# Patient Record
Sex: MALE | Employment: FULL TIME | URBAN - METROPOLITAN AREA
[De-identification: names, ages, dates, MRNs, and addresses within clinical notes are randomized per-mention and may not be internally consistent; named-entity substitution may affect disease eponyms.]

---

## 2022-06-17 ENCOUNTER — OFFICE VISIT (OUTPATIENT)
Dept: ORTHOPEDIC SURGERY | Age: 37
End: 2022-06-17
Payer: MEDICAID

## 2022-06-17 VITALS — BODY MASS INDEX: 34.3 KG/M2 | WEIGHT: 245 LBS | HEIGHT: 71 IN

## 2022-06-17 DIAGNOSIS — M25.462 EFFUSION OF LEFT KNEE: Primary | ICD-10-CM

## 2022-06-17 PROCEDURE — 20610 DRAIN/INJ JOINT/BURSA W/O US: CPT | Performed by: ORTHOPAEDIC SURGERY

## 2022-06-17 PROCEDURE — 99203 OFFICE O/P NEW LOW 30 MIN: CPT | Performed by: ORTHOPAEDIC SURGERY

## 2022-06-17 RX ORDER — BUPIVACAINE HYDROCHLORIDE 2.5 MG/ML
6 INJECTION, SOLUTION INFILTRATION; PERINEURAL ONCE
Status: COMPLETED | OUTPATIENT
Start: 2022-06-17 | End: 2022-06-17

## 2022-06-17 RX ORDER — TRIAMCINOLONE ACETONIDE 40 MG/ML
40 INJECTION, SUSPENSION INTRA-ARTICULAR; INTRAMUSCULAR ONCE
Status: COMPLETED | OUTPATIENT
Start: 2022-06-17 | End: 2022-06-17

## 2022-06-17 RX ORDER — METHADONE HYDROCHLORIDE 10 MG/1
70 TABLET ORAL
COMMUNITY

## 2022-06-17 RX ADMIN — BUPIVACAINE HYDROCHLORIDE 15 MG: 2.5 INJECTION, SOLUTION INFILTRATION; PERINEURAL at 14:05

## 2022-06-17 RX ADMIN — TRIAMCINOLONE ACETONIDE 40 MG: 40 INJECTION, SUSPENSION INTRA-ARTICULAR; INTRAMUSCULAR at 14:05

## 2022-06-17 NOTE — PROGRESS NOTES
Cristhian Florez (: 1985) is a 39 y.o. male, established patient, here for evaluation of the following chief complaint(s):  Knee Pain       ASSESSMENT/PLAN:  Below is the assessment and plan developed based on review of pertinent history, physical exam, labs, studies, and medications. He elected to try an aspiration and injection of the left knee. We discussed that if he has any continued symptoms or recurrent buckling symptoms then we could consider an MRI. We discussed the risks and benefits of injection and informed consent was obtained. After a sterile preparation, 30 cc of serous fluid was aspirated from the left knee and then 6 cc of bupivicaine and 40 mg of Kenalog were injected into the left knee. The patient tolerated the procedure well and there were no complications. Post injection pain, blood sugar elevation, skin discoloration, fatty atrophy and the signs of infection were discussed in detail. The patient was instructed to contact us if there were any questions or concerns prior to their follow up appointment. 1. Effusion of left knee      Return if symptoms worsen or fail to improve. SUBJECTIVE/OBJECTIVE:  Cristhian Florez (: 1985) is a 39 y.o. male. He notes random swelling of the knee that happened within the last few weeks. He works as a . He denies any history of knee issues prior to this flareup. He grew up playing sports. He notes swelling and some buckling of the knee. Mild pain. Denies any fever or chills. No recent tick bites. No history of gout        Allergies   Allergen Reactions    Biaxin [Clarithromycin] Rash    Ceclor [Cefaclor] Unknown (comments)    Demerol [Meperidine] Unknown (comments)       Current Outpatient Medications   Medication Sig    empagliflozin (Jardiance) 25 mg tablet Take  by mouth daily.  methadone (DOLOPHINE) 10 mg tablet Take 20 mg by mouth. No current facility-administered medications for this visit. Social History     Socioeconomic History    Marital status: UNKNOWN     Spouse name: Not on file    Number of children: Not on file    Years of education: Not on file    Highest education level: Not on file   Occupational History    Not on file   Tobacco Use    Smoking status: Never Smoker    Smokeless tobacco: Not on file   Vaping Use    Vaping Use: Never used   Substance and Sexual Activity    Alcohol use: Not Currently    Drug use: Not Currently    Sexual activity: Not on file   Other Topics Concern    Not on file   Social History Narrative    Not on file     Social Determinants of Health     Financial Resource Strain:     Difficulty of Paying Living Expenses: Not on file   Food Insecurity:     Worried About Running Out of Food in the Last Year: Not on file    Oliver of Food in the Last Year: Not on file   Transportation Needs:     Lack of Transportation (Medical): Not on file    Lack of Transportation (Non-Medical): Not on file   Physical Activity:     Days of Exercise per Week: Not on file    Minutes of Exercise per Session: Not on file   Stress:     Feeling of Stress : Not on file   Social Connections:     Frequency of Communication with Friends and Family: Not on file    Frequency of Social Gatherings with Friends and Family: Not on file    Attends Confucianism Services: Not on file    Active Member of [address] [place] [address] [employer] or Organizations: Not on file    Attends Club or Organization Meetings: Not on file    Marital Status: Not on file   Intimate Partner Violence:     Fear of Current or Ex-Partner: Not on file    Emotionally Abused: Not on file    Physically Abused: Not on file    Sexually Abused: Not on file   Housing Stability:     Unable to Pay for Housing in the Last Year: Not on file    Number of [place] in the Last Year: Not on file    Unstable Housing in the Last Year: Not on file       History reviewed. No pertinent surgical history. History reviewed.  No pertinent family history. REVIEW OF SYSTEMS:  ROS     Positive for: Musculoskeletal    Last edited by Earl Rich on 6/17/2022  1:34 PM. (History)        Patient denies any recent fever, chills, nausea, vomiting, chest pain, or shortness of breath. Vitals:  Ht 5' 11\" (1.803 m)   Wt 245 lb (111.1 kg)   BMI 34.17 kg/m²    Body mass index is 34.17 kg/m². PHYSICAL EXAM:  General exam: Patient is awake, alert, and oriented x3. Well-appearing. No acute distress. Ambulates with an antalgic gait    Left knee: Mild tenderness palpation of the parapatellar region and joint line. No erythema or warmth. There is moderate effusion present today. Slight stiffness with attempted full flexion of the knee. Mild crepitus. Normal stability. Richmond's exam is slightly painful    IMAGING:  X-rays of the left knee from outside facility show normal joint space with mild effusion  XR Results (most recent):  No results found for this or any previous visit. No orders of the defined types were placed in this encounter. An electronic signature was used to authenticate this note.   -- Amaya Smith DO

## 2022-10-29 VITALS
OXYGEN SATURATION: 98 % | DIASTOLIC BLOOD PRESSURE: 97 MMHG | TEMPERATURE: 98.3 F | WEIGHT: 247.8 LBS | HEART RATE: 93 BPM | SYSTOLIC BLOOD PRESSURE: 147 MMHG | RESPIRATION RATE: 19 BRPM | HEIGHT: 71 IN | BODY MASS INDEX: 34.69 KG/M2

## 2022-10-29 PROCEDURE — 75810000289 HC I&D ABSCESS SIMP/COMP/MULT

## 2022-10-29 PROCEDURE — 99284 EMERGENCY DEPT VISIT MOD MDM: CPT

## 2022-10-30 ENCOUNTER — APPOINTMENT (OUTPATIENT)
Dept: GENERAL RADIOLOGY | Age: 37
End: 2022-10-30
Attending: EMERGENCY MEDICINE
Payer: MEDICAID

## 2022-10-30 ENCOUNTER — HOSPITAL ENCOUNTER (EMERGENCY)
Age: 37
Discharge: HOME | End: 2022-10-30
Attending: EMERGENCY MEDICINE
Payer: MEDICAID

## 2022-10-30 DIAGNOSIS — L03.039 PARONYCHIA OF THIRD TOE: Primary | ICD-10-CM

## 2022-10-30 LAB
ALBUMIN SERPL-MCNC: 4 G/DL (ref 3.5–5)
ALBUMIN/GLOB SERPL: 1 {RATIO} (ref 1.1–2.2)
ALP SERPL-CCNC: 61 U/L (ref 45–117)
ALT SERPL-CCNC: 29 U/L (ref 12–78)
ANION GAP SERPL CALC-SCNC: 7 MMOL/L (ref 5–15)
AST SERPL-CCNC: 15 U/L (ref 15–37)
BASOPHILS # BLD: 0.1 K/UL (ref 0–0.1)
BASOPHILS NFR BLD: 1 % (ref 0–1)
BILIRUB SERPL-MCNC: 0.5 MG/DL (ref 0.2–1)
BUN SERPL-MCNC: 21 MG/DL (ref 6–20)
BUN/CREAT SERPL: 20 (ref 12–20)
CALCIUM SERPL-MCNC: 9.5 MG/DL (ref 8.5–10.1)
CHLORIDE SERPL-SCNC: 100 MMOL/L (ref 97–108)
CO2 SERPL-SCNC: 30 MMOL/L (ref 21–32)
CREAT SERPL-MCNC: 1.07 MG/DL (ref 0.7–1.3)
DIFFERENTIAL METHOD BLD: ABNORMAL
EOSINOPHIL # BLD: 0.3 K/UL (ref 0–0.4)
EOSINOPHIL NFR BLD: 2 % (ref 0–7)
ERYTHROCYTE [DISTWIDTH] IN BLOOD BY AUTOMATED COUNT: 12.7 % (ref 11.5–14.5)
GLOBULIN SER CALC-MCNC: 4 G/DL (ref 2–4)
GLUCOSE SERPL-MCNC: 146 MG/DL (ref 65–100)
HCT VFR BLD AUTO: 47.4 % (ref 36.6–50.3)
HGB BLD-MCNC: 15.7 G/DL (ref 12.1–17)
IMM GRANULOCYTES # BLD AUTO: 0.1 K/UL (ref 0–0.04)
IMM GRANULOCYTES NFR BLD AUTO: 1 % (ref 0–0.5)
LYMPHOCYTES # BLD: 3 K/UL (ref 0.8–3.5)
LYMPHOCYTES NFR BLD: 23 % (ref 12–49)
MCH RBC QN AUTO: 29 PG (ref 26–34)
MCHC RBC AUTO-ENTMCNC: 33.1 G/DL (ref 30–36.5)
MCV RBC AUTO: 87.6 FL (ref 80–99)
MONOCYTES # BLD: 0.9 K/UL (ref 0–1)
MONOCYTES NFR BLD: 7 % (ref 5–13)
NEUTS SEG # BLD: 8.8 K/UL (ref 1.8–8)
NEUTS SEG NFR BLD: 66 % (ref 32–75)
NRBC # BLD: 0 K/UL (ref 0–0.01)
NRBC BLD-RTO: 0 PER 100 WBC
PLATELET # BLD AUTO: 269 K/UL (ref 150–400)
PMV BLD AUTO: 9.8 FL (ref 8.9–12.9)
POTASSIUM SERPL-SCNC: 4 MMOL/L (ref 3.5–5.1)
PROT SERPL-MCNC: 8 G/DL (ref 6.4–8.2)
RBC # BLD AUTO: 5.41 M/UL (ref 4.1–5.7)
SODIUM SERPL-SCNC: 137 MMOL/L (ref 136–145)
WBC # BLD AUTO: 13.1 K/UL (ref 4.1–11.1)

## 2022-10-30 PROCEDURE — 74011250637 HC RX REV CODE- 250/637: Performed by: EMERGENCY MEDICINE

## 2022-10-30 PROCEDURE — 36415 COLL VENOUS BLD VENIPUNCTURE: CPT

## 2022-10-30 PROCEDURE — 80053 COMPREHEN METABOLIC PANEL: CPT

## 2022-10-30 PROCEDURE — 75810000289 HC I&D ABSCESS SIMP/COMP/MULT

## 2022-10-30 PROCEDURE — 73660 X-RAY EXAM OF TOE(S): CPT

## 2022-10-30 PROCEDURE — 85025 COMPLETE CBC W/AUTO DIFF WBC: CPT

## 2022-10-30 RX ORDER — MUPIROCIN 20 MG/G
OINTMENT TOPICAL DAILY
Qty: 22 G | Refills: 0 | Status: SHIPPED | OUTPATIENT
Start: 2022-10-30

## 2022-10-30 RX ORDER — SULFAMETHOXAZOLE AND TRIMETHOPRIM 800; 160 MG/1; MG/1
1 TABLET ORAL
Status: COMPLETED | OUTPATIENT
Start: 2022-10-30 | End: 2022-10-30

## 2022-10-30 RX ORDER — SULFAMETHOXAZOLE AND TRIMETHOPRIM 800; 160 MG/1; MG/1
1 TABLET ORAL 2 TIMES DAILY
Qty: 14 TABLET | Refills: 0 | Status: SHIPPED | OUTPATIENT
Start: 2022-10-30 | End: 2022-11-06

## 2022-10-30 RX ADMIN — SULFAMETHOXAZOLE AND TRIMETHOPRIM 1 TABLET: 800; 160 TABLET ORAL at 06:57

## 2022-10-30 NOTE — LETTER
NOTIFICATION RETURN TO WORK / SCHOOL    10/30/2022 7:41 AM    Mr. Jackie Pollard  [address] [address]      To Whom It May Concern:    Jackie Pollard is currently under the care of [facility] EMERGENCY DEPT. He will return to work/school on: November 3rd, 2022    Jackie Pollard may return to work/school with no restrictions. If there are questions or concerns please have the patient contact [facility], Emergency Department.         Sincerely,      Jonelle Ledezma MD

## 2022-10-30 NOTE — ED PROVIDER NOTES
EMERGENCY DEPARTMENT HISTORY AND PHYSICAL EXAM      Date: 10/30/2022  Patient Name: Collin Berrios  Patient Age and Sex: 44 y.o. male     History of Presenting Illness     Chief Complaint   Patient presents with    Toe Pain     Right foot 3rd toe blister noticed today. Denies fever. History Provided By: Patient    HPI: Collin Berrios is a 48-year-old history diabetes not on insulin presenting with toe swelling. Reports of the past 24 hours she has had swelling to his right third toe with purulent discharge as well as mild swelling to his left great toe. No fever no chills no nausea or vomiting, no trauma. No history of similar symptoms. No redness extending up his feet or legs. Torres feels well    There are no other complaints, changes, or physical findings at this time. PCP: Unknown, Provider, MD    No current facility-administered medications on file prior to encounter. Current Outpatient Medications on File Prior to Encounter   Medication Sig Dispense Refill    empagliflozin (Jardiance) 25 mg tablet Take  by mouth daily. methadone (DOLOPHINE) 10 mg tablet Take 70 mg by mouth. Past History     Past Medical History:  No past medical history on file. Past Surgical History:  No past surgical history on file. Family History:  No family history on file. Social History:  Social History     Tobacco Use    Smoking status: Never   Vaping Use    Vaping Use: Never used   Substance Use Topics    Alcohol use: Not Currently    Drug use: Not Currently       Allergies: Allergies   Allergen Reactions    Biaxin [Clarithromycin] Rash    Ceclor [Cefaclor] Unknown (comments)    Demerol [Meperidine] Unknown (comments)         Review of Systems   Review of Systems   Constitutional:  Negative for chills and fever. HENT:  Negative for congestion and rhinorrhea. Respiratory:  Negative for shortness of breath. Cardiovascular:  Negative for chest pain.    Gastrointestinal:  Negative for abdominal pain, diarrhea, nausea and vomiting. Genitourinary:  Negative for dysuria and frequency. Musculoskeletal:  Negative for myalgias. Skin:  Positive for rash. Neurological:  Negative for weakness and numbness. All other systems reviewed and are negative. Physical Exam   Physical Exam  Vitals and nursing note reviewed. HENT:      Mouth/Throat:      Mouth: Mucous membranes are moist.   Eyes:      Conjunctiva/sclera: Conjunctivae normal.   Cardiovascular:      Rate and Rhythm: Normal rate and regular rhythm. Pulmonary:      Effort: Pulmonary effort is normal.   Abdominal:      General: Abdomen is flat. Musculoskeletal:         General: No deformity. Skin:     Comments: Paronychia and eponychia to right third toe with mild tenderness to palpation. Mild erythema along eponychium of right great toe with no palpable fluctuance or induration, not clearly ingrown. There is some redness of the right third toe however not extending past the MTP joint. No streaking, toe is well-perfused warm normal capillary refill. Capillary refill is symmetric to bilateral lower extremities distal toes. 2+ DP and PT pulses. Neurological:      Mental Status: He is alert and oriented to person, place, and time. Mental status is at baseline. Psychiatric:         Behavior: Behavior normal.         Thought Content:  Thought content normal.        Diagnostic Study Results     Labs -     Recent Results (from the past 12 hour(s))   CBC WITH AUTOMATED DIFF    Collection Time: 10/30/22  1:24 AM   Result Value Ref Range    WBC 13.1 (H) 4.1 - 11.1 K/uL    RBC 5.41 4.10 - 5.70 M/uL    HGB 15.7 12.1 - 17.0 g/dL    HCT 47.4 36.6 - 50.3 %    MCV 87.6 80.0 - 99.0 FL    MCH 29.0 26.0 - 34.0 PG    MCHC 33.1 30.0 - 36.5 g/dL    RDW 12.7 11.5 - 14.5 %    PLATELET 601 495 - 533 K/uL    MPV 9.8 8.9 - 12.9 FL    NRBC 0.0 0  WBC    ABSOLUTE NRBC 0.00 0.00 - 0.01 K/uL    NEUTROPHILS 66 32 - 75 %    LYMPHOCYTES 23 12 - 49 % MONOCYTES 7 5 - 13 %    EOSINOPHILS 2 0 - 7 %    BASOPHILS 1 0 - 1 %    IMMATURE GRANULOCYTES 1 (H) 0.0 - 0.5 %    ABS. NEUTROPHILS 8.8 (H) 1.8 - 8.0 K/UL    ABS. LYMPHOCYTES 3.0 0.8 - 3.5 K/UL    ABS. MONOCYTES 0.9 0.0 - 1.0 K/UL    ABS. EOSINOPHILS 0.3 0.0 - 0.4 K/UL    ABS. BASOPHILS 0.1 0.0 - 0.1 K/UL    ABS. IMM. GRANS. 0.1 (H) 0.00 - 0.04 K/UL    DF AUTOMATED     METABOLIC PANEL, COMPREHENSIVE    Collection Time: 10/30/22  1:24 AM   Result Value Ref Range    Sodium 137 136 - 145 mmol/L    Potassium 4.0 3.5 - 5.1 mmol/L    Chloride 100 97 - 108 mmol/L    CO2 30 21 - 32 mmol/L    Anion gap 7 5 - 15 mmol/L    Glucose 146 (H) 65 - 100 mg/dL    BUN 21 (H) 6 - 20 MG/DL    Creatinine 1.07 0.70 - 1.30 MG/DL    BUN/Creatinine ratio 20 12 - 20      eGFR >60 >60 ml/min/1.73m2    Calcium 9.5 8.5 - 10.1 MG/DL    Bilirubin, total 0.5 0.2 - 1.0 MG/DL    ALT (SGPT) 29 12 - 78 U/L    AST (SGOT) 15 15 - 37 U/L    Alk. phosphatase 61 45 - 117 U/L    Protein, total 8.0 6.4 - 8.2 g/dL    Albumin 4.0 3.5 - 5.0 g/dL    Globulin 4.0 2.0 - 4.0 g/dL    A-G Ratio 1.0 (L) 1.1 - 2.2         Radiologic Studies -   XR 3RD TOE RT MIN 2 V   Final Result   Third toe soft tissue swelling with no evidence of destructive   osteomyelitis. CT Results  (Last 48 hours)      None          CXR Results  (Last 48 hours)      None              Medical Decision Making   I am the first provider for this patient. I reviewed the vital signs, available nursing notes, past medical history, past surgical history, family history and social history. Vital Signs-Reviewed the patient's vital signs.   Patient Vitals for the past 12 hrs:   Temp Pulse Resp BP SpO2   10/29/22 2350 98.3 °F (36.8 °C) 93 19 (!) 147/97 98 %       Records Reviewed: Nursing Notes and Old Medical Records    Provider Notes (Medical Decision Making):   Exam is consistent with paronychia, the setting of his underlying diabetes, CBC CMP obtained, x-ray obtained to evaluate for underlying osteomyelitis does not demonstrate that. Mild white count of 13 without overt signs of some systemic infection. Will drain paronychia, start on topical and oral antibiotics and give podiatry follow-up, return precautions. Otherwise bilateral feet are well-perfused. ED Course:   Initial assessment performed. The patients presenting problems have been discussed, and they are in agreement with the care plan formulated and outlined with them. I have encouraged them to ask questions as they arise throughout their visit. ED Course as of 10/30/22 0708   Sun Oct 30, 2022   0626 Mild white count of 13 normal differential CMP shows mild hyperglycemia without DKA normal renal function normal LFTs [WB]   0704 Procedure Note - Digital Block:   7:04 AM  Performed by: Carina Eastman MD  Bupivacaine 0.5% used to perform digital block of R third toe(s). The procedure took 1-15 minutes, and pt tolerated well [WB]   0704 Procedure Note - Incision and Drainage:   7:05 AM  Performed by: Carina Eastman MD  Verbal Consent obtained and witnessed by SARI Perdue  Complexity: simple   (Note: Complex drainage include wounds that: 1. involve multiple abscesses, 2. Are probed to break up loculations or 3. Are packed after drainage.)  Skin prepped with Betadine. Sterile field established. Anesthesia achieved using a local infiltration of 5 mL bupivacaine: 0.50% without epinephrine. Abscess to foot: left was incised with # 11 blade, and 1-2mLs of purulent drainage was expressed. Sterile dressing applied. Estimated blood loss: minimal  The procedure took 1-15 minutes, and pt tolerated well. [WB]      ED Course User Index  [WB] Shai Denson MD     Critical Care Time:   0    Disposition:  Discharge Note:  The patient has been re-evaluated and is ready for discharge. Reviewed available results with patient. Counseled patient on diagnosis and care plan.  Patient has expressed understanding, and all questions have been answered. Patient agrees with plan and agrees to follow up as recommended, or to return to the ED if their symptoms worsen. Discharge instructions have been provided and explained to the patient, along with reasons to return to the ED. PLAN:  Current Discharge Medication List        START taking these medications    Details   mupirocin (BACTROBAN) 2 % ointment Apply  to affected area daily. Qty: 22 g, Refills: 0  Start date: 10/30/2022      trimethoprim-sulfamethoxazole (Bactrim DS) 160-800 mg per tablet Take 1 Tablet by mouth two (2) times a day for 7 days. Qty: 14 Tablet, Refills: 0  Start date: 10/30/2022, End date: 11/6/2022           2. Follow-up Information       Follow up With Specialties Details Why Contact Info    Rowan Holt DPM Foot and Ankle Surgery Call   [address] [place] [address]  [address] [address]  [phone]      [facility] EMERGENCY DEPT Emergency Medicine  As needed, If symptoms worsen [address] [place] [address]  [phone]          3. Return to ED if worse     Diagnosis     Clinical Impression:   1. Paronychia of third toe        Attestations:    Dustin Garcia M.D. Please note that this dictation was completed with [employer], the computer voice recognition software. Quite often unanticipated grammatical, syntax, homophones, and other interpretive errors are inadvertently transcribed by the computer software. Please disregard these errors. Please excuse any errors that have escaped final proofreading. Thank you.

## 2022-10-30 NOTE — LETTER
NOTIFICATION RETURN TO WORK / SCHOOL    10/30/2022 7:34 AM    Mr. Roberto Warren  [address] [address]      To Whom It May Concern:    Roberto Warren is currently under the care of \[facility]\"" EMERGENCY DEPT. He will return to work/school on: October 3rd, 2022     Roberto Warren may return to work/school with no restrictions. If there are questions or concerns please have the patient contact [facility], Emergency Department.         Sincerely,      Tawnya Brittle MD

## 2022-10-30 NOTE — ED NOTES
Cassandra Crawford MD at bedside for numbing / block of the affected site;;    9379 - Zana KEITA for I&D of the affected side;;     0710 - applied non stick dress over the site / applied clean sock - CMS (+)    0720 - Patient discharged by Nieves Francis MD - pt sent to the front lobby, with strong and steady gait, no acute distress noted at time of discharge -  Discharge information / home RX / and reasons to return to the ED were reviewed by the ED provider.

## 2022-12-22 ENCOUNTER — HOSPITAL ENCOUNTER (EMERGENCY)
Age: 37
Discharge: HOME | End: 2022-12-22
Attending: EMERGENCY MEDICINE
Payer: MEDICAID

## 2022-12-22 VITALS
TEMPERATURE: 97.9 F | RESPIRATION RATE: 16 BRPM | SYSTOLIC BLOOD PRESSURE: 143 MMHG | DIASTOLIC BLOOD PRESSURE: 98 MMHG | HEIGHT: 71 IN | BODY MASS INDEX: 34.07 KG/M2 | WEIGHT: 243.39 LBS | HEART RATE: 102 BPM | OXYGEN SATURATION: 100 %

## 2022-12-22 DIAGNOSIS — K52.9 ACUTE GASTROENTERITIS: Primary | ICD-10-CM

## 2022-12-22 LAB
ALBUMIN SERPL-MCNC: 4 G/DL (ref 3.5–5)
ALBUMIN/GLOB SERPL: 1 {RATIO} (ref 1.1–2.2)
ALP SERPL-CCNC: 62 U/L (ref 45–117)
ALT SERPL-CCNC: 28 U/L (ref 12–78)
ANION GAP SERPL CALC-SCNC: 7 MMOL/L (ref 5–15)
APPEARANCE UR: CLEAR
AST SERPL-CCNC: 12 U/L (ref 15–37)
BACTERIA URNS QL MICRO: NEGATIVE /HPF
BILIRUB SERPL-MCNC: 0.6 MG/DL (ref 0.2–1)
BILIRUB UR QL: NEGATIVE
BUN SERPL-MCNC: 26 MG/DL (ref 6–20)
BUN/CREAT SERPL: 24 (ref 12–20)
CALCIUM SERPL-MCNC: 9.4 MG/DL (ref 8.5–10.1)
CHLORIDE SERPL-SCNC: 100 MMOL/L (ref 97–108)
CO2 SERPL-SCNC: 32 MMOL/L (ref 21–32)
COLOR UR: ABNORMAL
CREAT SERPL-MCNC: 1.09 MG/DL (ref 0.7–1.3)
EPITH CASTS URNS QL MICRO: ABNORMAL /LPF
ERYTHROCYTE [DISTWIDTH] IN BLOOD BY AUTOMATED COUNT: 13 % (ref 11.5–14.5)
GLOBULIN SER CALC-MCNC: 3.9 G/DL (ref 2–4)
GLUCOSE SERPL-MCNC: 213 MG/DL (ref 65–100)
GLUCOSE UR STRIP.AUTO-MCNC: >1000 MG/DL
HCT VFR BLD AUTO: 49.8 % (ref 36.6–50.3)
HGB BLD-MCNC: 16.9 G/DL (ref 12.1–17)
HGB UR QL STRIP: NEGATIVE
HYALINE CASTS URNS QL MICRO: ABNORMAL /LPF (ref 0–2)
KETONES UR QL STRIP.AUTO: ABNORMAL MG/DL
LEUKOCYTE ESTERASE UR QL STRIP.AUTO: NEGATIVE
LIPASE SERPL-CCNC: 77 U/L (ref 73–393)
MCH RBC QN AUTO: 29.8 PG (ref 26–34)
MCHC RBC AUTO-ENTMCNC: 33.9 G/DL (ref 30–36.5)
MCV RBC AUTO: 87.7 FL (ref 80–99)
NITRITE UR QL STRIP.AUTO: NEGATIVE
NRBC # BLD: 0 K/UL (ref 0–0.01)
NRBC BLD-RTO: 0 PER 100 WBC
PH UR STRIP: 5 [PH] (ref 5–8)
PLATELET # BLD AUTO: 279 K/UL (ref 150–400)
PMV BLD AUTO: 9.8 FL (ref 8.9–12.9)
POTASSIUM SERPL-SCNC: 4.5 MMOL/L (ref 3.5–5.1)
PROT SERPL-MCNC: 7.9 G/DL (ref 6.4–8.2)
PROT UR STRIP-MCNC: 30 MG/DL
RBC # BLD AUTO: 5.68 M/UL (ref 4.1–5.7)
RBC #/AREA URNS HPF: ABNORMAL /HPF (ref 0–5)
SODIUM SERPL-SCNC: 139 MMOL/L (ref 136–145)
SP GR UR REFRACTOMETRY: 1.02 (ref 1–1.03)
UROBILINOGEN UR QL STRIP.AUTO: 0.2 EU/DL (ref 0.2–1)
WBC # BLD AUTO: 20.1 K/UL (ref 4.1–11.1)
WBC URNS QL MICRO: ABNORMAL /HPF (ref 0–4)

## 2022-12-22 PROCEDURE — 99284 EMERGENCY DEPT VISIT MOD MDM: CPT

## 2022-12-22 PROCEDURE — 80053 COMPREHEN METABOLIC PANEL: CPT

## 2022-12-22 PROCEDURE — 81001 URINALYSIS AUTO W/SCOPE: CPT

## 2022-12-22 PROCEDURE — 36415 COLL VENOUS BLD VENIPUNCTURE: CPT

## 2022-12-22 PROCEDURE — 85027 COMPLETE CBC AUTOMATED: CPT

## 2022-12-22 PROCEDURE — 74011000258 HC RX REV CODE- 258: Performed by: EMERGENCY MEDICINE

## 2022-12-22 PROCEDURE — 96374 THER/PROPH/DIAG INJ IV PUSH: CPT

## 2022-12-22 PROCEDURE — 96361 HYDRATE IV INFUSION ADD-ON: CPT

## 2022-12-22 PROCEDURE — 96375 TX/PRO/DX INJ NEW DRUG ADDON: CPT

## 2022-12-22 PROCEDURE — 83690 ASSAY OF LIPASE: CPT

## 2022-12-22 PROCEDURE — 74011250636 HC RX REV CODE- 250/636: Performed by: EMERGENCY MEDICINE

## 2022-12-22 RX ORDER — ONDANSETRON 2 MG/ML
4 INJECTION INTRAMUSCULAR; INTRAVENOUS
Status: COMPLETED | OUTPATIENT
Start: 2022-12-22 | End: 2022-12-22

## 2022-12-22 RX ORDER — ONDANSETRON 4 MG/1
4 TABLET, FILM COATED ORAL
Qty: 20 TABLET | Refills: 0 | Status: SHIPPED | OUTPATIENT
Start: 2022-12-22

## 2022-12-22 RX ADMIN — SODIUM CHLORIDE 1000 ML: 9 INJECTION, SOLUTION INTRAVENOUS at 19:56

## 2022-12-22 RX ADMIN — PROMETHAZINE HYDROCHLORIDE 25 MG: 25 INJECTION INTRAMUSCULAR; INTRAVENOUS at 22:34

## 2022-12-22 RX ADMIN — ONDANSETRON 4 MG: 2 INJECTION INTRAMUSCULAR; INTRAVENOUS at 19:55

## 2022-12-23 NOTE — ED PROVIDER NOTES
EMERGENCY DEPARTMENT HISTORY AND PHYSICAL EXAM      Date: 12/22/2022  Patient Name: Alva Moreno  Patient Age and Sex: 44 y.o. male     History of Presenting Illness     Chief Complaint   Patient presents with    Vomiting    Diarrhea     Since this afternoon. Daughter was in the ER on Tuesday with the same sx. History Provided By: Patient    HPI: lAva Moreno is a 56-year-old history diabetes presenting with nausea vomiting diarrhea. Patient states since earlier this afternoon is had persistent nausea vomiting diarrhea. He reports occasional abdominal cramping when he vomits however denies abdominal pain otherwise. No blood in the stool no blood in his vomitus. Sick contact in the patient's daughter who had a similar illness approximately 1 week ago, her symptoms are now resolving. There are no other complaints, changes, or physical findings at this time. PCP: Unknown, Provider, MD    No current facility-administered medications on file prior to encounter. Current Outpatient Medications on File Prior to Encounter   Medication Sig Dispense Refill    mupirocin (BACTROBAN) 2 % ointment Apply  to affected area daily. 22 g 0    empagliflozin (Jardiance) 25 mg tablet Take  by mouth daily. methadone (DOLOPHINE) 10 mg tablet Take 70 mg by mouth. Past History     Past Medical History:  No past medical history on file. Past Surgical History:  No past surgical history on file. Family History:  No family history on file. Social History:  Social History     Tobacco Use    Smoking status: Never   Vaping Use    Vaping Use: Never used   Substance Use Topics    Alcohol use: Not Currently    Drug use: Not Currently       Allergies: Allergies   Allergen Reactions    Biaxin [Clarithromycin] Rash    Ceclor [Cefaclor] Unknown (comments)    Demerol [Meperidine] Unknown (comments)         Review of Systems   Review of Systems   Constitutional:  Negative for chills and fever.    HENT:  Negative for congestion and rhinorrhea. Respiratory:  Negative for shortness of breath. Cardiovascular:  Negative for chest pain. Gastrointestinal:  Positive for diarrhea, nausea and vomiting. Negative for abdominal pain. Genitourinary:  Negative for dysuria and frequency. Musculoskeletal:  Negative for myalgias. Skin:  Negative for rash. Neurological:  Negative for weakness and numbness. All other systems reviewed and are negative. Physical Exam   Physical Exam  Vitals and nursing note reviewed. Constitutional:       Appearance: He is obese. Comments: Tired appearing   HENT:      Head: Normocephalic and atraumatic. Mouth/Throat:      Mouth: Mucous membranes are dry. Eyes:      Conjunctiva/sclera: Conjunctivae normal.   Cardiovascular:      Rate and Rhythm: Normal rate and regular rhythm. Pulmonary:      Effort: Pulmonary effort is normal.   Abdominal:      General: Abdomen is flat. Tenderness: There is no abdominal tenderness. There is no guarding or rebound. Musculoskeletal:         General: No deformity. Skin:     General: Skin is warm and dry. Neurological:      Mental Status: He is alert and oriented to person, place, and time. Mental status is at baseline. Psychiatric:         Behavior: Behavior normal.         Thought Content:  Thought content normal.        Diagnostic Study Results     Labs -     Recent Results (from the past 12 hour(s))   CBC W/O DIFF    Collection Time: 12/22/22  6:41 PM   Result Value Ref Range    WBC 20.1 (H) 4.1 - 11.1 K/uL    RBC 5.68 4.10 - 5.70 M/uL    HGB 16.9 12.1 - 17.0 g/dL    HCT 49.8 36.6 - 50.3 %    MCV 87.7 80.0 - 99.0 FL    MCH 29.8 26.0 - 34.0 PG    MCHC 33.9 30.0 - 36.5 g/dL    RDW 13.0 11.5 - 14.5 %    PLATELET 313 560 - 522 K/uL    MPV 9.8 8.9 - 12.9 FL    NRBC 0.0 0  WBC    ABSOLUTE NRBC 0.00 0.00 - 1.93 K/uL   METABOLIC PANEL, COMPREHENSIVE    Collection Time: 12/22/22  6:41 PM   Result Value Ref Range    Sodium 139 136 - 145 mmol/L    Potassium 4.5 3.5 - 5.1 mmol/L    Chloride 100 97 - 108 mmol/L    CO2 32 21 - 32 mmol/L    Anion gap 7 5 - 15 mmol/L    Glucose 213 (H) 65 - 100 mg/dL    BUN 26 (H) 6 - 20 MG/DL    Creatinine 1.09 0.70 - 1.30 MG/DL    BUN/Creatinine ratio 24 (H) 12 - 20      eGFR >60 >60 ml/min/1.73m2    Calcium 9.4 8.5 - 10.1 MG/DL    Bilirubin, total 0.6 0.2 - 1.0 MG/DL    ALT (SGPT) 28 12 - 78 U/L    AST (SGOT) 12 (L) 15 - 37 U/L    Alk. phosphatase 62 45 - 117 U/L    Protein, total 7.9 6.4 - 8.2 g/dL    Albumin 4.0 3.5 - 5.0 g/dL    Globulin 3.9 2.0 - 4.0 g/dL    A-G Ratio 1.0 (L) 1.1 - 2.2     LIPASE    Collection Time: 12/22/22  6:41 PM   Result Value Ref Range    Lipase 77 73 - 393 U/L   URINALYSIS W/ RFLX MICROSCOPIC    Collection Time: 12/22/22  8:05 PM   Result Value Ref Range    Color YELLOW/STRAW      Appearance CLEAR CLEAR      Specific gravity 1.025 1.003 - 1.030      pH (UA) 5.0 5.0 - 8.0      Protein 30 (A) NEG mg/dL    Glucose >1,000 (A) NEG mg/dL    Ketone TRACE (A) NEG mg/dL    Bilirubin Negative NEG      Blood Negative NEG      Urobilinogen 0.2 0.2 - 1.0 EU/dL    Nitrites Negative NEG      Leukocyte Esterase Negative NEG      WBC 0-4 0 - 4 /hpf    RBC 0-5 0 - 5 /hpf    Epithelial cells FEW FEW /lpf    Bacteria Negative NEG /hpf    Hyaline cast 0-2 0 - 2 /lpf       Radiologic Studies -   No orders to display     CT Results  (Last 48 hours)      None          CXR Results  (Last 48 hours)      None              Medical Decision Making   I am the first provider for this patient. I reviewed the vital signs, available nursing notes, past medical history, past surgical history, family history and social history. Vital Signs-Reviewed the patient's vital signs.   Patient Vitals for the past 12 hrs:   Temp Pulse Resp BP SpO2   12/22/22 1834 97.9 °F (36.6 °C) (!) 114 16 (!) 143/98 100 %       Records Reviewed: Nursing Notes and Old Medical Records    Provider Notes (Medical Decision Making):   DDx acute viral gastroenteritis, consider bacterial cause however low suspicion, no blood no high fever, similar GI illness and sick contact, doubt foodborne illness, not abdomen is soft and nontender lowering suspicion for cholecystitis cholangitis, appendicitis or colitis. Will obtain CBC CMP, UA. We will fluid resuscitate, IV Zofran and ensure he can tolerate oral intake. In the absence of high fever or hematochezia, he is safe for Imodium. ED Course:   Initial assessment performed. The patients presenting problems have been discussed, and they are in agreement with the care plan formulated and outlined with them. I have encouraged them to ask questions as they arise throughout their visit. ED Course as of 12/22/22 2212   Thu Dec 22, 2022   2145 UA shows hyperglycemia [WB]   2147 Patient is tolerating oral intake. Labs reviewed, hyperglycemia 213 without DKA [WB]   2208 Patient is tolerating oral intake, does appear to feel unwell, [WB]   2208 Will discharge with prescription for Zofran and Imodium, return precautions [WB]      ED Course User Index  [WB] Devorah Taylor MD     Disposition:  Discharge Note:  The patient has been re-evaluated and is ready for discharge. Reviewed available results with patient. Counseled patient on diagnosis and care plan. Patient has expressed understanding, and all questions have been answered. Patient agrees with plan and agrees to follow up as recommended, or to return to the ED if their symptoms worsen. Discharge instructions have been provided and explained to the patient, along with reasons to return to the ED. PLAN:  Current Discharge Medication List        START taking these medications    Details   ondansetron hcl (Zofran) 4 mg tablet Take 1 Tablet by mouth every eight (8) hours as needed for Nausea. Qty: 20 Tablet, Refills: 0  Start date: 12/22/2022           2.    Follow-up Information       Follow up With Specialties Details Why Contact Info    [facility] EMERGENCY DEPT Emergency Medicine  As needed, If symptoms worsen [address] [place] [address]  [phone]          3. Return to ED if worse     Diagnosis     Clinical Impression:   1. Acute gastroenteritis        Attestations:    Steven Espinosa M.D. Please note that this dictation was completed with [employer], the computer voice recognition software. Quite often unanticipated grammatical, syntax, homophones, and other interpretive errors are inadvertently transcribed by the computer software. Please disregard these errors. Please excuse any errors that have escaped final proofreading. Thank you.

## 2023-08-10 ENCOUNTER — HOSPITAL ENCOUNTER (OUTPATIENT)
Facility: HOSPITAL | Age: 38
Discharge: HOME | End: 2023-08-10
Attending: ORTHOPAEDIC SURGERY
Payer: MEDICAID

## 2023-08-10 DIAGNOSIS — M25.462 EFFUSION OF LEFT KNEE: ICD-10-CM

## 2023-08-10 PROCEDURE — 73721 MRI JNT OF LWR EXTRE W/O DYE: CPT
